# Patient Record
Sex: MALE | URBAN - NONMETROPOLITAN AREA
[De-identification: names, ages, dates, MRNs, and addresses within clinical notes are randomized per-mention and may not be internally consistent; named-entity substitution may affect disease eponyms.]

---

## 2022-01-13 ENCOUNTER — APPOINTMENT (RX ONLY)
Dept: URBAN - NONMETROPOLITAN AREA CLINIC 22 | Facility: CLINIC | Age: 47
Setting detail: DERMATOLOGY
End: 2022-01-13

## 2022-01-13 DIAGNOSIS — D485 NEOPLASM OF UNCERTAIN BEHAVIOR OF SKIN: ICD-10-CM

## 2022-01-13 PROBLEM — D48.5 NEOPLASM OF UNCERTAIN BEHAVIOR OF SKIN: Status: ACTIVE | Noted: 2022-01-13

## 2022-01-13 PROCEDURE — ? COUNSELING

## 2022-01-13 PROCEDURE — ? ADDITIONAL NOTES

## 2022-01-13 PROCEDURE — 11102 TANGNTL BX SKIN SINGLE LES: CPT

## 2022-01-13 PROCEDURE — ? BIOPSY BY SHAVE METHOD

## 2022-01-13 ASSESSMENT — LOCATION DETAILED DESCRIPTION DERM: LOCATION DETAILED: LEFT INFERIOR PARIETAL SCALP

## 2022-01-13 ASSESSMENT — LOCATION ZONE DERM: LOCATION ZONE: SCALP

## 2022-01-13 ASSESSMENT — LOCATION SIMPLE DESCRIPTION DERM: LOCATION SIMPLE: SCALP

## 2022-01-13 NOTE — PROCEDURE: MIPS QUALITY
Quality 431: Preventive Care And Screening: Unhealthy Alcohol Use - Screening: Patient screened for unhealthy alcohol use using a single question and scores less than 2 times per year
Additional Notes: The E/M service provided during this visit was medically necessary, significant, and independent from the procedure(s) provided on the same date of service and included documented elements above and beyond the usual pre-, intra-, and post-operative work associated with the procedure(s).
Detail Level: Detailed
Quality 226: Preventive Care And Screening: Tobacco Use: Screening And Cessation Intervention: Patient screened for tobacco use and is an ex/non-smoker
Quality 130: Documentation Of Current Medications In The Medical Record: Current Medications Documented

## 2023-07-17 NOTE — PROCEDURE: ADDITIONAL NOTES
Specialty Pharmacy - Refill Coordination    Specialty Medication Orders Linked to Encounter      Flowsheet Row Most Recent Value   Medication #1 COSENTYX PEN, 2 PENS, 150 mg/mL PnIj (Order#[id number], Rx#[id number])            Refill Questions - Documented Responses      Flowsheet Row Most Recent Value   Patient Availability and HIPAA Verification    Does patient want to proceed with activity? Yes   HIPAA/medical authority confirmed? Yes   Relationship to patient of person spoken to? Self   Refill Screening Questions    Changes to allergies? No   Changes to medications? No   New conditions since last clinic visit? No   Unplanned office visit, urgent care, ED, or hospital admission in the last 4 weeks? No   How does patient/caregiver feel medication is working? Good   Financial problems or insurance changes? No   How many doses of your specialty medications were missed in the last 4 weeks? 0   Would patient like to speak to a pharmacist? No   When does the patient need to receive the medication? 07/25/23   Refill Delivery Questions    How will the patient receive the medication? [facility]   When does the patient need to receive the medication? 07/25/23   Shipping Address Home   Address in [facility] confirmed and updated if neccessary? Yes   Expected Copay ($) 0   Is the patient able to afford the medication copay? Yes   Payment Method zero copay   Days supply of Refill 30   Supplies needed? No supplies needed   Refill activity completed? Yes   Refill activity plan Refill scheduled   Shipment/Pickup Date: 07/21/23            Current Outpatient Medications   Medication Sig    allopurinoL (ZYLOPRIM) 300 MG tablet Take 1 tablet (300 mg total) by mouth once daily.    COSENTYX PEN, 2 PENS, 150 mg/mL PnIj Inject 2 pens (300 mg) into the skin every 30 days.    ezetimibe (ZETIA) 10 mg tablet Take 1 tablet (10 mg total) by mouth once daily.    FLUoxetine 20 MG tablet Take 1 tablet (20 mg total) by mouth once daily.    
folic acid (FOLVITE) 1 MG tablet Take 1 tablet (1 mg total) by mouth Daily.    lisinopriL-hydrochlorothiazide (PRINZIDE,ZESTORETIC) 20-12.5 mg per tablet Take 1 tablet by mouth once daily.    rosuvastatin (CRESTOR) 20 MG tablet Take 1 tablet (20 mg total) by mouth once daily.    semaglutide, weight loss, (WEGOVY) 0.25 mg/0.5 mL PnIj Inject 0.25 mg into the skin every 7 days.   Last reviewed on 6/12/2023  4:06 PM by Cristóbal Washington PA-C    Review of patient's allergies indicates:   Allergen Reactions    Pistachio nut     Last reviewed on  6/12/2023 4:06 PM by Cristóbal Washington      Tasks added this encounter   No tasks added.   Tasks due within next 3 months   7/15/2023 - Refill Coordination Outreach (1 time occurrence)     [facility]  [facility] [address]  [facility] [id number]  Phone: [phone]  Fax: [phone]        
Detail Level: Simple
Additional Notes: Patient consent was obtained to proceed with the visit and recommended plan of care after discussion of all risks and benefits, including the risks of COVID-19 exposure.